# Patient Record
Sex: MALE | Race: WHITE | NOT HISPANIC OR LATINO | Employment: STUDENT | URBAN - METROPOLITAN AREA
[De-identification: names, ages, dates, MRNs, and addresses within clinical notes are randomized per-mention and may not be internally consistent; named-entity substitution may affect disease eponyms.]

---

## 2023-08-08 ENCOUNTER — HOSPITAL ENCOUNTER (EMERGENCY)
Facility: CLINIC | Age: 23
Discharge: HOME | End: 2023-08-08
Attending: EMERGENCY MEDICINE | Admitting: EMERGENCY MEDICINE
Payer: COMMERCIAL

## 2023-08-08 VITALS
TEMPERATURE: 97.6 F | RESPIRATION RATE: 14 BRPM | OXYGEN SATURATION: 100 % | WEIGHT: 163.36 LBS | DIASTOLIC BLOOD PRESSURE: 87 MMHG | BODY MASS INDEX: 23.39 KG/M2 | HEART RATE: 54 BPM | HEIGHT: 70 IN | SYSTOLIC BLOOD PRESSURE: 136 MMHG

## 2023-08-08 DIAGNOSIS — R00.2 PALPITATIONS: ICD-10-CM

## 2023-08-08 LAB
ALBUMIN SERPL BCG-MCNC: 5 G/DL (ref 3.5–5.2)
ALP SERPL-CCNC: 81 U/L (ref 40–129)
ALT SERPL W P-5'-P-CCNC: 28 U/L (ref 0–70)
ANION GAP SERPL CALCULATED.3IONS-SCNC: 11 MMOL/L (ref 7–15)
AST SERPL W P-5'-P-CCNC: 30 U/L (ref 0–45)
BASOPHILS # BLD AUTO: 0 10E3/UL (ref 0–0.2)
BASOPHILS NFR BLD AUTO: 1 %
BILIRUB SERPL-MCNC: 0.3 MG/DL
BUN SERPL-MCNC: 17.3 MG/DL (ref 6–20)
CALCIUM SERPL-MCNC: 9.6 MG/DL (ref 8.6–10)
CHLORIDE SERPL-SCNC: 104 MMOL/L (ref 98–107)
CREAT SERPL-MCNC: 1.11 MG/DL (ref 0.67–1.17)
DEPRECATED HCO3 PLAS-SCNC: 26 MMOL/L (ref 22–29)
EOSINOPHIL # BLD AUTO: 0.2 10E3/UL (ref 0–0.7)
EOSINOPHIL NFR BLD AUTO: 5 %
ERYTHROCYTE [DISTWIDTH] IN BLOOD BY AUTOMATED COUNT: 12.3 % (ref 10–15)
GFR SERPL CREATININE-BSD FRML MDRD: >90 ML/MIN/1.73M2
GLUCOSE SERPL-MCNC: 107 MG/DL (ref 70–99)
HCT VFR BLD AUTO: 45.5 % (ref 40–53)
HGB BLD-MCNC: 15.3 G/DL (ref 13.3–17.7)
HOLD SPECIMEN: NORMAL
HOLD SPECIMEN: NORMAL
IMM GRANULOCYTES # BLD: 0 10E3/UL
IMM GRANULOCYTES NFR BLD: 0 %
LIPASE SERPL-CCNC: 42 U/L (ref 13–60)
LYMPHOCYTES # BLD AUTO: 2 10E3/UL (ref 0.8–5.3)
LYMPHOCYTES NFR BLD AUTO: 42 %
MCH RBC QN AUTO: 29.4 PG (ref 26.5–33)
MCHC RBC AUTO-ENTMCNC: 33.6 G/DL (ref 31.5–36.5)
MCV RBC AUTO: 88 FL (ref 78–100)
MONOCYTES # BLD AUTO: 0.5 10E3/UL (ref 0–1.3)
MONOCYTES NFR BLD AUTO: 9 %
NEUTROPHILS # BLD AUTO: 2.1 10E3/UL (ref 1.6–8.3)
NEUTROPHILS NFR BLD AUTO: 43 %
NRBC # BLD AUTO: 0 10E3/UL
NRBC BLD AUTO-RTO: 0 /100
PLATELET # BLD AUTO: 275 10E3/UL (ref 150–450)
POTASSIUM SERPL-SCNC: 4 MMOL/L (ref 3.4–5.3)
PROT SERPL-MCNC: 7.6 G/DL (ref 6.4–8.3)
RBC # BLD AUTO: 5.2 10E6/UL (ref 4.4–5.9)
SODIUM SERPL-SCNC: 141 MMOL/L (ref 136–145)
TROPONIN T SERPL HS-MCNC: <6 NG/L
WBC # BLD AUTO: 4.8 10E3/UL (ref 4–11)

## 2023-08-08 PROCEDURE — 99284 EMERGENCY DEPT VISIT MOD MDM: CPT

## 2023-08-08 PROCEDURE — 84484 ASSAY OF TROPONIN QUANT: CPT | Performed by: EMERGENCY MEDICINE

## 2023-08-08 PROCEDURE — 83690 ASSAY OF LIPASE: CPT | Performed by: EMERGENCY MEDICINE

## 2023-08-08 PROCEDURE — 85025 COMPLETE CBC W/AUTO DIFF WBC: CPT | Performed by: EMERGENCY MEDICINE

## 2023-08-08 PROCEDURE — 36415 COLL VENOUS BLD VENIPUNCTURE: CPT | Performed by: EMERGENCY MEDICINE

## 2023-08-08 PROCEDURE — 80053 COMPREHEN METABOLIC PANEL: CPT | Performed by: EMERGENCY MEDICINE

## 2023-08-08 PROCEDURE — 93005 ELECTROCARDIOGRAM TRACING: CPT

## 2023-08-08 ASSESSMENT — ACTIVITIES OF DAILY LIVING (ADL)
ADLS_ACUITY_SCORE: 33
ADLS_ACUITY_SCORE: 35

## 2023-08-08 NOTE — ED TRIAGE NOTES
Pt reports onset of palpitations while at a hockey game last week. Reports any exertion has been causing onset of palpitations. Intermittent CP and SOB as well as belching x4 days. ABCs intact.

## 2023-08-09 LAB
ATRIAL RATE - MUSE: 58 BPM
DIASTOLIC BLOOD PRESSURE - MUSE: NORMAL MMHG
INTERPRETATION ECG - MUSE: NORMAL
P AXIS - MUSE: 55 DEGREES
PR INTERVAL - MUSE: 148 MS
QRS DURATION - MUSE: 98 MS
QT - MUSE: 410 MS
QTC - MUSE: 402 MS
R AXIS - MUSE: 82 DEGREES
SYSTOLIC BLOOD PRESSURE - MUSE: NORMAL MMHG
T AXIS - MUSE: 38 DEGREES
VENTRICULAR RATE- MUSE: 58 BPM

## 2023-08-09 NOTE — ED PROVIDER NOTES
"History   Chief Complaint:  Palpitations    HPI   History supplemented by electronic chart review    Sterling Durham is a 23 year old male who presents with his mother for evaluation of palpitations that have been intermittent, occurring every few days over the past week, brief in duration and not associated with any particular activity.  He has no fevers, cough, or chest pain.  Minimal trouble breathing during these palpitations but no syncope.  No prior diagnosis of heart or lung problems.  No family history of congenital heart disease or premature cardiac death.  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  No leg swelling.  No history of DVT or PE.  No history of thyroid problems.  No recent vomiting or diarrhea.  He does not take any medications.    Independent Historian: His mother, who corroborates the patient's history and also states that they made numerous phone calls to various local clinics attempting to get seen today but were unable to be seen in a timely manner, so came to the emergency department for evaluation.    Review of External Notes: I performed electronic chart review, I do not see any prior cardiac testing    Medications:    No daily medications    Past Medical History:    Past Medical History:   Diagnosis Date    Asthma      Physical Exam   Patient Vitals for the past 24 hrs:   BP Temp Temp src Pulse Resp SpO2 Height Weight   08/08/23 2045 -- -- -- 54 14 100 % -- --   08/08/23 2030 -- -- -- -- 15 100 % -- --   08/08/23 1735 136/87 97.6  F (36.4  C) Temporal 69 16 100 % 1.778 m (5' 10\") 74.1 kg (163 lb 5.8 oz)      Physical Exam  General: male sitting upright in 33, mother nearby  HENT: mucous membranes moist, thyroid nonpalpable  CV: rate as above, regular rhythm, no lower extremity edema, no JVD, palpable symmetric radial pulses, no murmur audible  Resp: normal " effort, speaks in full phrases, no stridor, no cough observed  GI: abdomen soft and nontender  MSK: no bony tenderness   Skin: appropriately warm and dry  Neuro: alert, clear speech, oriented   Psych: cooperative, pleasant    Emergency Department Course     ECG results from 08/08/23   EKG 12-lead, tracing only     Value    Systolic Blood Pressure     Diastolic Blood Pressure     Ventricular Rate 58    Atrial Rate 58    UT Interval 148    QRS Duration 98        QTc 402    P Axis 55    R AXIS 82    T Axis 38    Interpretation ECG      Sinus bradycardia      Laboratory:  Labs Ordered and Resulted from Time of ED Arrival to Time of ED Departure   COMPREHENSIVE METABOLIC PANEL - Abnormal       Result Value    Sodium 141      Potassium 4.0      Chloride 104      Carbon Dioxide (CO2) 26      Anion Gap 11      Urea Nitrogen 17.3      Creatinine 1.11      Calcium 9.6      Glucose 107 (*)     Alkaline Phosphatase 81      AST 30      ALT 28      Protein Total 7.6      Albumin 5.0      Bilirubin Total 0.3      GFR Estimate >90     TROPONIN T, HIGH SENSITIVITY - Normal    Troponin T, High Sensitivity <6     LIPASE - Normal    Lipase 42     CBC WITH PLATELETS AND DIFFERENTIAL    WBC Count 4.8      RBC Count 5.20      Hemoglobin 15.3      Hematocrit 45.5      MCV 88      MCH 29.4      MCHC 33.6      RDW 12.3      Platelet Count 275      % Neutrophils 43      % Lymphocytes 42      % Monocytes 9      % Eosinophils 5      % Basophils 1      % Immature Granulocytes 0      NRBCs per 100 WBC 0      Absolute Neutrophils 2.1      Absolute Lymphocytes 2.0      Absolute Monocytes 0.5      Absolute Eosinophils 0.2      Absolute Basophils 0.0      Absolute Immature Granulocytes 0.0      Absolute NRBCs 0.0          Emergency Department Course:  Reviewed:  I reviewed nursing notes, vitals, and past medical history    Assessments/Consultations/Discussion of Management or Tests :  I obtained history and examined the patient as noted above.       Interventions:  Medications - No data to display     Social Determinants of Health affecting care:   Healthcare Access/Compliance    Disposition:  Discharged    Impression & Plan    Medical Decision Making:  I discussed with this very pleasant patient and his mother that I cannot definitively diagnose the cause of his palpitations recently.  At this time he feels well, electrocardiogram is very reassuring as are his vital signs and laboratory studies including an undetectable troponin which adequately rules out acute coronary syndrome especially in the setting of very low clinical suspicion.  Patient understands that ectopy and a variety of arrhythmias remain on the differential, and for this reason he wished to pursue further cardiac monitoring, for this reason I have ordered a Zio patch to be placed as an outpatient in the near future, followed by an [facility] order for a cardiology clinic visit to discuss the results.  I do not identify risk factors for sudden cardiac death or major arrhythmia and therefore do not think that he requires hospitalization or immediate echocardiogram or cardiology consultation.  He is negative by PERC criteria making pulmonary embolism extremely unlikely as well.  No major metabolic abnormalities.  Presentation not suggestive of infection.  We discussed the possibility of chest x-ray which was offered, though he ultimately elected to decline this, which I think is reasonable in light of his normal cardiopulmonary exam, excellent oxygen saturation, lack of infectious symptoms, etc.  He was asked to return here for sudden worsening at any hour and was discharged home in the company of his mother.    Diagnosis:    ICD-10-CM    1. Palpitations  R00.2 Follow-Up with Cardiology [facility]     Leadless EKG Monitor 3 to 14 Days         8/8/2023   MD Em David, Jeffry Stovall MD  08/08/23 5299

## 2023-09-11 ENCOUNTER — TELEPHONE (OUTPATIENT)
Dept: CARDIOLOGY | Facility: CLINIC | Age: 23
End: 2023-09-11
Payer: COMMERCIAL

## 2023-09-11 NOTE — TELEPHONE ENCOUNTER
Called pt to see where he had his monitor done and he did not have it done. Will discuss with Dr. Noel if its beneficial.